# Patient Record
(demographics unavailable — no encounter records)

---

## 2025-06-06 NOTE — ASSESSMENT
[Vaccines Reviewed] : Immunizations reviewed today. Please see immunization details in the vaccine log within the immunization flowsheet.  [FreeTextEntry1] : Annual Physical Exam:  - Check A1c, CBC, CMP, Lipid profile, TSH, Urinalysis, Vitamin levels -Advised to limit marijuana use  - RTO annually or as needed.  -Possible shin splints, muscular from running. Is improving. Advised ice, if no improvement or worsening symptoms to obtain imaging. Pt verbalized understanding and will reach out should any questions/concerns occur.

## 2025-06-06 NOTE — HEALTH RISK ASSESSMENT
[Good] : ~his/her~  mood as  good [No] : In the past 12 months have you used drugs other than those required for medical reasons? No [0] : 2) Feeling down, depressed, or hopeless: Not at all (0) [PHQ-2 Negative - No further assessment needed] : PHQ-2 Negative - No further assessment needed [Time Spent: ___ Minutes] : I spent [unfilled] minutes performing a depression screening for this patient. [FreeTextEntry1] : health maintenance [[id number]] : 0 [BoneDensityComments] :  n/a [ColonoscopyComments] :  n/a

## 2025-06-06 NOTE — HISTORY OF PRESENT ILLNESS
[FreeTextEntry1] : Patient is present today to establish care and for a comprehensive Annual Physical Exam. [de-identified] : Patient is a 36yr old M who is present today to establish care and for a comprehensive Annual Physical Exam.  Patient is doing well overall. Denies being on any medication, FHX of Stage IV lymphoma from father who has been in in remission for 10-12 years, Denies any previous surgeries. Denies tobacco and alcohol use but does confirm daily marijuana use. Denies anxiety and depression. Confirms stable sleep and weight. UTD with [facility] visits. Pt consented to testicular exam. Pt c/o of tenderness in right calf. Confirms recent running but pt is unsure if pain started around onset of running. Denies any swelling or erythema. Provider recommends massaging or placing a cold compress on the area.   Denies any CP or SOB. Denies any abdominal pain, urinary symptom, or change in bowel habits. Denies any fever, chills, or night sweats.

## 2025-06-06 NOTE — ADDENDUM
[FreeTextEntry1] :  I, Leeann Bone, acted as a scribe on behalf of Dr. Mateusz Uribe MD, on 06/06/2025.  All medical entries made by the scribe were at my, Dr. Mateusz Uribe MD, direction and personally dictated by me on 06/06/2025. I have reviewed the chart and agree that the record accurately reflects my personal performance of the history, physical exam, assessment and plan. I have also personally directed, reviewed, and agreed with the chart.

## 2025-06-06 NOTE — PHYSICAL EXAM
[Normal] : soft, non-tender, non-distended, no masses palpated, no HSM and normal bowel sounds [de-identified] : discomfort to palpation over the anterior shin